# Patient Record
Sex: FEMALE | Race: WHITE | URBAN - METROPOLITAN AREA
[De-identification: names, ages, dates, MRNs, and addresses within clinical notes are randomized per-mention and may not be internally consistent; named-entity substitution may affect disease eponyms.]

---

## 2017-08-14 ENCOUNTER — OFFICE VISIT (OUTPATIENT)
Dept: DERMATOLOGY | Age: 37
End: 2017-08-14

## 2017-08-14 DIAGNOSIS — D23.9 DERMATOFIBROMA: ICD-10-CM

## 2017-08-14 DIAGNOSIS — D22.9 MULTIPLE NEVI: Primary | ICD-10-CM

## 2017-08-14 DIAGNOSIS — D48.5 NEOPLASM OF UNCERTAIN BEHAVIOR OF SKIN: ICD-10-CM

## 2017-08-14 PROCEDURE — 11100 PR BIOPSY OF SKIN LESION: CPT | Performed by: DERMATOLOGY

## 2017-08-14 PROCEDURE — 99202 OFFICE O/P NEW SF 15 MIN: CPT | Performed by: DERMATOLOGY

## 2017-08-22 ENCOUNTER — TELEPHONE (OUTPATIENT)
Dept: DERMATOLOGY | Age: 37
End: 2017-08-22

## 2018-05-15 ENCOUNTER — OFFICE VISIT (OUTPATIENT)
Dept: DERMATOLOGY | Age: 38
End: 2018-05-15

## 2018-05-15 DIAGNOSIS — D22.9 MULTIPLE NEVI: Primary | ICD-10-CM

## 2018-05-15 DIAGNOSIS — Z86.018 HISTORY OF DYSPLASTIC NEVUS: ICD-10-CM

## 2018-05-15 PROCEDURE — 99212 OFFICE O/P EST SF 10 MIN: CPT | Performed by: DERMATOLOGY

## 2019-01-21 LAB
ALBUMIN SERPL-MCNC: 4.5 G/DL
ALP BLD-CCNC: 48 U/L
ALT SERPL-CCNC: 7 U/L
ANION GAP SERPL CALCULATED.3IONS-SCNC: 6 MMOL/L
AST SERPL-CCNC: 16 U/L
BASOPHILS ABSOLUTE: 0 /ΜL
BASOPHILS RELATIVE PERCENT: 0.2 %
BILIRUB SERPL-MCNC: 0.6 MG/DL (ref 0.1–1.4)
BUN BLDV-MCNC: 20 MG/DL
CALCIUM SERPL-MCNC: 9.7 MG/DL
CHLORIDE BLD-SCNC: 105 MMOL/L
CHOLESTEROL, FASTING: 166
CO2: 26 MMOL/L
CREAT SERPL-MCNC: 0.83 MG/DL
EOSINOPHILS ABSOLUTE: 0.1 /ΜL
EOSINOPHILS RELATIVE PERCENT: 2.4 %
GFR CALCULATED: 78
GLUCOSE BLD-MCNC: 95 MG/DL
HCT VFR BLD CALC: 38.6 % (ref 36–46)
HDLC SERPL-MCNC: 58 MG/DL (ref 35–70)
HEMOGLOBIN: 13.2 G/DL (ref 12–16)
LDL CHOLESTEROL CALCULATED: 96 MG/DL (ref 0–160)
LYMPHOCYTES ABSOLUTE: 1.7 /ΜL
LYMPHOCYTES RELATIVE PERCENT: 42.3 %
MCH RBC QN AUTO: 30 PG
MCHC RBC AUTO-ENTMCNC: 34.3 G/DL
MCV RBC AUTO: 87.5 FL
MONOCYTES ABSOLUTE: 0.3 /ΜL
MONOCYTES RELATIVE PERCENT: 7 %
NEUTROPHILS ABSOLUTE: 2 /ΜL
NEUTROPHILS RELATIVE PERCENT: 48.1 %
PLATELET # BLD: 168 K/ΜL
PMV BLD AUTO: 10.5 FL
POTASSIUM SERPL-SCNC: 4.5 MMOL/L
RBC # BLD: 4.41 10^6/ΜL
SODIUM BLD-SCNC: 137 MMOL/L
TOTAL PROTEIN: 7.1
TRIGLYCERIDE, FASTING: 62
WBC # BLD: 4.1 10^3/ML

## 2019-03-28 ENCOUNTER — OFFICE VISIT (OUTPATIENT)
Dept: PRIMARY CARE CLINIC | Age: 39
End: 2019-03-28
Payer: COMMERCIAL

## 2019-03-28 VITALS
DIASTOLIC BLOOD PRESSURE: 78 MMHG | WEIGHT: 143.4 LBS | HEART RATE: 78 BPM | SYSTOLIC BLOOD PRESSURE: 110 MMHG | OXYGEN SATURATION: 99 %

## 2019-03-28 DIAGNOSIS — J01.00 ACUTE NON-RECURRENT MAXILLARY SINUSITIS: ICD-10-CM

## 2019-03-28 DIAGNOSIS — H66.92 LEFT OTITIS MEDIA WITH SPONTANEOUS RUPTURE OF EARDRUM: Primary | ICD-10-CM

## 2019-03-28 DIAGNOSIS — H72.92 LEFT OTITIS MEDIA WITH SPONTANEOUS RUPTURE OF EARDRUM: Primary | ICD-10-CM

## 2019-03-28 PROCEDURE — 99202 OFFICE O/P NEW SF 15 MIN: CPT | Performed by: NURSE PRACTITIONER

## 2019-03-28 RX ORDER — AMOXICILLIN AND CLAVULANATE POTASSIUM 875; 125 MG/1; MG/1
1 TABLET, FILM COATED ORAL 2 TIMES DAILY
Qty: 20 TABLET | Refills: 0 | Status: SHIPPED | OUTPATIENT
Start: 2019-03-28 | End: 2019-10-25 | Stop reason: SDUPTHER

## 2019-03-28 RX ORDER — AMOXICILLIN 500 MG/1
CAPSULE ORAL
Refills: 0 | COMMUNITY
Start: 2019-03-23 | End: 2019-03-28 | Stop reason: ALTCHOICE

## 2019-03-28 RX ORDER — CIPROFLOXACIN AND DEXAMETHASONE 3; 1 MG/ML; MG/ML
4 SUSPENSION/ DROPS AURICULAR (OTIC) 2 TIMES DAILY
Qty: 1 BOTTLE | Refills: 0 | Status: SHIPPED | OUTPATIENT
Start: 2019-03-28 | End: 2019-10-25 | Stop reason: SDUPTHER

## 2019-03-28 ASSESSMENT — ENCOUNTER SYMPTOMS
SHORTNESS OF BREATH: 0
EYE PAIN: 0
RHINORRHEA: 0
WHEEZING: 0
SINUS PRESSURE: 1
BACK PAIN: 0
PHOTOPHOBIA: 0
DIARRHEA: 0
TROUBLE SWALLOWING: 0
EYE DISCHARGE: 0
EYE ITCHING: 0
NAUSEA: 0
COLOR CHANGE: 0
CONSTIPATION: 0
CHEST TIGHTNESS: 0
EYE REDNESS: 0
SINUS PAIN: 0
COUGH: 0
SORE THROAT: 0
VOMITING: 0
ABDOMINAL PAIN: 0

## 2019-04-01 ENCOUNTER — OFFICE VISIT (OUTPATIENT)
Dept: PRIMARY CARE CLINIC | Age: 39
End: 2019-04-01
Payer: COMMERCIAL

## 2019-04-01 VITALS
TEMPERATURE: 98.4 F | RESPIRATION RATE: 16 BRPM | HEART RATE: 76 BPM | DIASTOLIC BLOOD PRESSURE: 76 MMHG | WEIGHT: 143 LBS | SYSTOLIC BLOOD PRESSURE: 108 MMHG | OXYGEN SATURATION: 99 %

## 2019-04-01 DIAGNOSIS — H72.92 LEFT OTITIS MEDIA WITH SPONTANEOUS RUPTURE OF EARDRUM: Primary | ICD-10-CM

## 2019-04-01 DIAGNOSIS — H66.92 LEFT OTITIS MEDIA WITH SPONTANEOUS RUPTURE OF EARDRUM: Primary | ICD-10-CM

## 2019-04-01 PROCEDURE — 99212 OFFICE O/P EST SF 10 MIN: CPT | Performed by: NURSE PRACTITIONER

## 2019-04-01 RX ORDER — METHYLPREDNISOLONE 4 MG/1
TABLET ORAL
Qty: 1 KIT | Refills: 0 | Status: SHIPPED | OUTPATIENT
Start: 2019-04-01 | End: 2019-05-14

## 2019-04-01 ASSESSMENT — ENCOUNTER SYMPTOMS
EYE DISCHARGE: 0
WHEEZING: 0
EYE PAIN: 0
TROUBLE SWALLOWING: 0
COUGH: 0
SORE THROAT: 0
EYE ITCHING: 0
RHINORRHEA: 0
SHORTNESS OF BREATH: 0
PHOTOPHOBIA: 0
SINUS PAIN: 0
CHEST TIGHTNESS: 0
COLOR CHANGE: 0
SINUS PRESSURE: 1
EYE REDNESS: 0

## 2019-04-01 NOTE — PROGRESS NOTES
mouth.      Return in about 1 week (around 4/8/2019). Patient agrees to plan and expresses understanding.     Dimitry-Grade-Allee 18, APRN - NP

## 2019-05-14 ENCOUNTER — OFFICE VISIT (OUTPATIENT)
Dept: DERMATOLOGY | Age: 39
End: 2019-05-14
Payer: COMMERCIAL

## 2019-05-14 DIAGNOSIS — L57.0 LICHENOID KERATOSIS: ICD-10-CM

## 2019-05-14 DIAGNOSIS — D22.9 MULTIPLE NEVI: Primary | ICD-10-CM

## 2019-05-14 DIAGNOSIS — D23.9 DERMATOFIBROMA: ICD-10-CM

## 2019-05-14 DIAGNOSIS — Z86.018 HISTORY OF DYSPLASTIC NEVUS: ICD-10-CM

## 2019-05-14 PROCEDURE — 99213 OFFICE O/P EST LOW 20 MIN: CPT | Performed by: DERMATOLOGY

## 2019-05-14 NOTE — PROGRESS NOTES
[facility]  [address]      Kervin Gomes  1980    42 y.o. female     Date of Visit: 2019    Last seen:     Chief Complaint: moles, f/u dysplastic nevus  Chief Complaint   Patient presents with    Skin Exam     History of Present Illness:    1, 2. Here for f/u s/p bx of dysplastic nevus on the R upper back at visit in .   s/p bx of lesion on the Rt upper back  - read as dysplastic nevus - mild dysplasia but removed with the biopsy. No probs with bx site. No new concerns or changing nevi noted. She notes a lesion on the abdomen that she would like checked. No personal hx of skin cancer. No family hx of skin cancer except her uncle (lived in [place]) just passed away from skin cancer. Wears sunscreen regularly on her face and on the rest of her body when at the pool. She is a teacher at [employer]. She has 10year-old twins and a 3-2/2year-old daughter. They go to [place]. No tanning bed use. Review of Systems:  Gen: Feels well, good sense of health. Skin: No changing moles or lesions. Past Medical History, Family History, Surgical History, Medications and Allergies reviewed. No outpatient medications have been marked as taking for the 19 encounter (Office Visit) with Lawrence Griffin MD.     Allergies   Allergen Reactions    Bactrim [Sulfamethoxazole-Trimethoprim]        History reviewed. No pertinent past medical history. Past Surgical History:   Procedure Laterality Date     SECTION         Physical Examination     Gen, well-appearing  The following were examined and determined to be normal: Psych/Neuro, Scalp/hair, Head/face, Conjunctivae/eyelids, Gums/teeth/lips, Neck, Breast/axilla/chest, Abdomen, Back, RUE, LUE, RLE, LLE, Nails/digits and buttocks. The following were examined and determined to be abnormal: none.      trunk and extremities with scattered brown macules and papules, many dark brown  R flank with oval dark brown uniform macule  Abdomen with angulated medium brown macule  Central back with macular slightly hypopigmented smooth scar with central pinpoint light brown pigment - stable  L calf with firm dermal pinkish-brown papule   R shin with pink 2 mm shiny macule            Assessment and Plan     1, 2. Benign-appearing nevi and Hx of dysplastic nevus - R upper back  3. DF - L calf and ? LK - R shin   - educ re ABCD's of MM   educ sun protection   encouraged skin check yearly (sooner if indicated), self checks  - check R shin again in 1 year - sooner prn growth, bleeding,.  sx

## 2019-10-25 ENCOUNTER — OFFICE VISIT (OUTPATIENT)
Dept: PRIMARY CARE CLINIC | Age: 39
End: 2019-10-25
Payer: COMMERCIAL

## 2019-10-25 VITALS
SYSTOLIC BLOOD PRESSURE: 102 MMHG | HEART RATE: 74 BPM | WEIGHT: 140.6 LBS | DIASTOLIC BLOOD PRESSURE: 64 MMHG | OXYGEN SATURATION: 99 % | TEMPERATURE: 98.4 F

## 2019-10-25 DIAGNOSIS — H66.93 RECURRENT AOM (ACUTE OTITIS MEDIA) OF BOTH EARS: Primary | ICD-10-CM

## 2019-10-25 PROCEDURE — 99213 OFFICE O/P EST LOW 20 MIN: CPT | Performed by: NURSE PRACTITIONER

## 2019-10-25 RX ORDER — CIPROFLOXACIN AND DEXAMETHASONE 3; 1 MG/ML; MG/ML
4 SUSPENSION/ DROPS AURICULAR (OTIC) 2 TIMES DAILY
Qty: 1 BOTTLE | Refills: 0 | Status: SHIPPED | OUTPATIENT
Start: 2019-10-25 | End: 2019-11-01

## 2019-10-25 RX ORDER — AMOXICILLIN AND CLAVULANATE POTASSIUM 875; 125 MG/1; MG/1
1 TABLET, FILM COATED ORAL 2 TIMES DAILY
Qty: 20 TABLET | Refills: 0 | Status: SHIPPED | OUTPATIENT
Start: 2019-10-25 | End: 2019-11-04

## 2019-10-25 ASSESSMENT — ENCOUNTER SYMPTOMS
SORE THROAT: 0
RHINORRHEA: 1
COUGH: 0
SHORTNESS OF BREATH: 0

## 2019-10-30 ENCOUNTER — OFFICE VISIT (OUTPATIENT)
Dept: ENT CLINIC | Age: 39
End: 2019-10-30
Payer: COMMERCIAL

## 2019-10-30 ENCOUNTER — TELEPHONE (OUTPATIENT)
Dept: ENT CLINIC | Age: 39
End: 2019-10-30

## 2019-10-30 VITALS
DIASTOLIC BLOOD PRESSURE: 73 MMHG | TEMPERATURE: 97.1 F | HEIGHT: 65 IN | BODY MASS INDEX: 23.66 KG/M2 | SYSTOLIC BLOOD PRESSURE: 111 MMHG | HEART RATE: 65 BPM | WEIGHT: 142 LBS

## 2019-10-30 DIAGNOSIS — H69.83 DYSFUNCTION OF BOTH EUSTACHIAN TUBES: Primary | ICD-10-CM

## 2019-10-30 PROCEDURE — 99243 OFF/OP CNSLTJ NEW/EST LOW 30: CPT | Performed by: OTOLARYNGOLOGY

## 2019-10-30 ASSESSMENT — ENCOUNTER SYMPTOMS
WHEEZING: 0
EYE DISCHARGE: 0
STRIDOR: 0
TROUBLE SWALLOWING: 0
BACK PAIN: 0
VOMITING: 0
BLOOD IN STOOL: 0
SINUS PRESSURE: 0
PHOTOPHOBIA: 0
SINUS PAIN: 0
NAUSEA: 0
EYE ITCHING: 0
CONSTIPATION: 0
COLOR CHANGE: 0
SHORTNESS OF BREATH: 0
DIARRHEA: 0
RHINORRHEA: 0
FACIAL SWELLING: 0
SORE THROAT: 0
COUGH: 0
VOICE CHANGE: 0
CHOKING: 0

## 2019-10-31 ENCOUNTER — TELEPHONE (OUTPATIENT)
Dept: ENT CLINIC | Age: 39
End: 2019-10-31

## 2019-11-15 ENCOUNTER — PROCEDURE VISIT (OUTPATIENT)
Dept: ENT CLINIC | Age: 39
End: 2019-11-15
Payer: COMMERCIAL

## 2019-11-15 VITALS
BODY MASS INDEX: 22.99 KG/M2 | TEMPERATURE: 97.2 F | SYSTOLIC BLOOD PRESSURE: 106 MMHG | DIASTOLIC BLOOD PRESSURE: 64 MMHG | WEIGHT: 138 LBS | HEART RATE: 72 BPM | HEIGHT: 65 IN

## 2019-11-15 DIAGNOSIS — H69.83 DYSFUNCTION OF BOTH EUSTACHIAN TUBES: Primary | ICD-10-CM

## 2019-11-15 PROCEDURE — 69433 CREATE EARDRUM OPENING: CPT | Performed by: OTOLARYNGOLOGY

## 2019-11-20 ENCOUNTER — OFFICE VISIT (OUTPATIENT)
Dept: PRIMARY CARE CLINIC | Age: 39
End: 2019-11-20
Payer: COMMERCIAL

## 2019-11-20 VITALS
TEMPERATURE: 98.2 F | OXYGEN SATURATION: 95 % | WEIGHT: 137.8 LBS | BODY MASS INDEX: 22.14 KG/M2 | HEIGHT: 66 IN | SYSTOLIC BLOOD PRESSURE: 102 MMHG | DIASTOLIC BLOOD PRESSURE: 68 MMHG | HEART RATE: 68 BPM

## 2019-11-20 DIAGNOSIS — Z00.00 ROUTINE GENERAL MEDICAL EXAMINATION AT A HEALTH CARE FACILITY: Primary | ICD-10-CM

## 2019-11-20 PROCEDURE — 99395 PREV VISIT EST AGE 18-39: CPT | Performed by: NURSE PRACTITIONER

## 2019-11-20 SDOH — HEALTH STABILITY: MENTAL HEALTH: HOW MANY STANDARD DRINKS CONTAINING ALCOHOL DO YOU HAVE ON A TYPICAL DAY?: 1 OR 2

## 2019-11-20 ASSESSMENT — PATIENT HEALTH QUESTIONNAIRE - PHQ9
SUM OF ALL RESPONSES TO PHQ QUESTIONS 1-9: 0
SUM OF ALL RESPONSES TO PHQ9 QUESTIONS 1 & 2: 0
2. FEELING DOWN, DEPRESSED OR HOPELESS: 0
SUM OF ALL RESPONSES TO PHQ QUESTIONS 1-9: 0
1. LITTLE INTEREST OR PLEASURE IN DOING THINGS: 0

## 2019-11-20 ASSESSMENT — ENCOUNTER SYMPTOMS
ALLERGIC/IMMUNOLOGIC NEGATIVE: 1
NAUSEA: 0
SHORTNESS OF BREATH: 0
COUGH: 0
VOMITING: 0
EYES NEGATIVE: 1
DIARRHEA: 0
CONSTIPATION: 0

## 2019-12-06 ENCOUNTER — OFFICE VISIT (OUTPATIENT)
Dept: ENT CLINIC | Age: 39
End: 2019-12-06
Payer: COMMERCIAL

## 2019-12-06 VITALS
HEART RATE: 63 BPM | TEMPERATURE: 97 F | WEIGHT: 138 LBS | DIASTOLIC BLOOD PRESSURE: 62 MMHG | SYSTOLIC BLOOD PRESSURE: 100 MMHG | BODY MASS INDEX: 22.18 KG/M2 | HEIGHT: 66 IN

## 2019-12-06 DIAGNOSIS — H69.83 DYSFUNCTION OF BOTH EUSTACHIAN TUBES: Primary | ICD-10-CM

## 2019-12-06 PROCEDURE — 99213 OFFICE O/P EST LOW 20 MIN: CPT | Performed by: OTOLARYNGOLOGY

## 2019-12-06 ASSESSMENT — ENCOUNTER SYMPTOMS
NAUSEA: 0
COLOR CHANGE: 0
SINUS PRESSURE: 0
VOICE CHANGE: 0
CHOKING: 0
EYE ITCHING: 0
DIARRHEA: 0
TROUBLE SWALLOWING: 0
SORE THROAT: 0
FACIAL SWELLING: 0
STRIDOR: 0
EYE PAIN: 0
PHOTOPHOBIA: 0
EYE REDNESS: 0
SINUS PAIN: 0
RHINORRHEA: 0
SHORTNESS OF BREATH: 0
COUGH: 0

## 2020-06-16 ENCOUNTER — OFFICE VISIT (OUTPATIENT)
Dept: ENT CLINIC | Age: 40
End: 2020-06-16
Payer: COMMERCIAL

## 2020-06-16 VITALS
WEIGHT: 138 LBS | HEIGHT: 66 IN | BODY MASS INDEX: 22.18 KG/M2 | DIASTOLIC BLOOD PRESSURE: 65 MMHG | HEART RATE: 68 BPM | SYSTOLIC BLOOD PRESSURE: 98 MMHG | TEMPERATURE: 97.2 F

## 2020-06-16 PROCEDURE — 99212 OFFICE O/P EST SF 10 MIN: CPT | Performed by: OTOLARYNGOLOGY

## 2020-06-16 ASSESSMENT — ENCOUNTER SYMPTOMS
PHOTOPHOBIA: 0
VOICE CHANGE: 0
SHORTNESS OF BREATH: 0
CHOKING: 0
RHINORRHEA: 0
EYE REDNESS: 0
SINUS PRESSURE: 0
EYE ITCHING: 0
SORE THROAT: 0
SINUS PAIN: 0
COLOR CHANGE: 0
EYE PAIN: 0
NAUSEA: 0
STRIDOR: 0
TROUBLE SWALLOWING: 0
DIARRHEA: 0
FACIAL SWELLING: 0
COUGH: 0

## 2020-06-16 NOTE — PROGRESS NOTES
[facility]  [address] [address] [facility], [address] [place] [address] Andre  P: [phone]  F: [phone]       Patient     Luc Holley  1980    ChiefComplaint     Chief Complaint   Patient presents with    Follow-up     Patient is here today for her 4 month follow up onher ears, she says here ears still get a little stuffy now and then but that it is better than what it has been        History of Present Illness     Luc Holley is a pleasant 47 y.o. female here for follow-up for bilateral eustachian tube dysfunction. She states her symptoms have significantly improved compared to prior to tube placement. However she does have some mild intermittent pressure sensation. No change in hearing, tinnitus or dizziness. No otorrhea or otalgia. Past Medical History     Past Medical History:   Diagnosis Date    Dysplastic nevus     Recurrent AOM (acute otitis media) 2019       Past Surgical History     Past Surgical History:   Procedure Laterality Date     SECTION  2012    TYMPANOSTOMY TUBE PLACEMENT Bilateral 2019    Both ears    WISDOM TOOTH EXTRACTION         Family History     Family History   Problem Relation Age of Onset    High Blood Pressure Mother     Colon Polyps Father     Bipolar Disorder Brother        Social History     Social History     Socioeconomic History    Marital status:      Spouse name: Not on file    Number of children: Not on file    Years of education: Not on file    Highest education level: Not on file   Occupational History    Not on file   Social Needs    Financial resource strain: Not on file    Food insecurity     Worry: Not on file     Inability: Not on file    Transportation needs     Medical: Not on file     Non-medical: Not on file   Tobacco Use    Smoking status: Never Smoker    Smokeless tobacco: Never Used   Substance and Sexual Activity    Alcohol use:  Yes     Alcohol/week: 1.0 standard drinks     Types: 1 Psychiatric/Behavioral: Negative for agitation and confusion. PhysicalExam     Vitals:    06/16/20 0916   BP: 98/65   Pulse: 68   Temp: 97.2 °F (36.2 °C)       Physical Exam  Constitutional:       Appearance: She is well-developed. HENT:      Head: Normocephalic and atraumatic. Jaw: No trismus. Right Ear: Tympanic membrane, ear canal and external ear normal. No drainage. No middle ear effusion. A PE tube is present. Tympanic membrane is not perforated. Left Ear: Tympanic membrane, ear canal and external ear normal. No drainage. No middle ear effusion. A PE tube is present. Tympanic membrane is not perforated. Nose: No septal deviation, mucosal edema or rhinorrhea. Mouth/Throat:      Dentition: Normal dentition. Pharynx: Uvula midline. No oropharyngeal exudate. Eyes:      General: No scleral icterus. Right eye: No discharge. Left eye: No discharge. Pupils: Pupils are equal, round, and reactive to light. Neck:      Musculoskeletal: Neck supple. Thyroid: No thyromegaly. Trachea: Phonation normal. No tracheal deviation. Pulmonary:      Effort: Pulmonary effort is normal. No respiratory distress. Breath sounds: No stridor. Lymphadenopathy:      Cervical: No cervical adenopathy. Skin:     General: Skin is warm and dry. Neurological:      Mental Status: She is alert and oriented to person, place, and time. Cranial Nerves: No cranial nerve deficit. Psychiatric:         Behavior: Behavior normal.           Procedure           Assessment and Plan     1. Dysfunction of both eustachian tubes  PE tubes in good position and patent. No otorrhea. Symptoms well controlled. Follow-up in 4 months. Return in about 4 months (around 10/16/2020). Portions of this note were dictated using Dragon.  There may be linguistic errors secondary to the use of this program.

## 2020-11-13 ENCOUNTER — OFFICE VISIT (OUTPATIENT)
Dept: ENT CLINIC | Age: 40
End: 2020-11-13
Payer: COMMERCIAL

## 2020-11-13 VITALS
WEIGHT: 138 LBS | SYSTOLIC BLOOD PRESSURE: 103 MMHG | DIASTOLIC BLOOD PRESSURE: 63 MMHG | HEIGHT: 66 IN | BODY MASS INDEX: 22.18 KG/M2 | HEART RATE: 71 BPM | TEMPERATURE: 97.9 F

## 2020-11-13 PROCEDURE — 99213 OFFICE O/P EST LOW 20 MIN: CPT | Performed by: OTOLARYNGOLOGY

## 2020-11-13 PROCEDURE — 92504 EAR MICROSCOPY EXAMINATION: CPT | Performed by: OTOLARYNGOLOGY

## 2020-11-13 ASSESSMENT — ENCOUNTER SYMPTOMS
TROUBLE SWALLOWING: 0
VOICE CHANGE: 0
SHORTNESS OF BREATH: 0
NAUSEA: 0
DIARRHEA: 0
CHOKING: 0
COUGH: 0
STRIDOR: 0
EYE PAIN: 0
COLOR CHANGE: 0
FACIAL SWELLING: 0
EYE REDNESS: 0
PHOTOPHOBIA: 0
SINUS PAIN: 0
SINUS PRESSURE: 0
SORE THROAT: 0
RHINORRHEA: 0
EYE ITCHING: 0

## 2020-11-13 NOTE — PROGRESS NOTES
[facility]  [address] [address] [facility], [address] [place] [address]  P: [phone]  F: [phone]       Patient     Yary Arroyo  1980    ChiefComplaint     Chief Complaint   Patient presents with    Follow-up     Patient is here today for her 4 month follow up on her ears, both ears are doing well but the left ear when she tucks on her ear lobe it hurts a little more than the right ear       History of Present Illness     Yary Arroyo is a pleasant 47 y.o. female here for 4-month follow-up for bilateral eustachian tube dysfunction. She states overall she is feeling relatively well. She does have a slight twinge of pressure in the left ear every now and then. No significant change in hearing. No otorrhea. No otalgia. Past Medical History     Past Medical History:   Diagnosis Date    Dysplastic nevus     Recurrent AOM (acute otitis media) 2019       Past Surgical History     Past Surgical History:   Procedure Laterality Date     SECTION  2012    TYMPANOSTOMY TUBE PLACEMENT Bilateral 2019    Both ears    WISDOM TOOTH EXTRACTION         Family History     Family History   Problem Relation Age of Onset    High Blood Pressure Mother     Colon Polyps Father     Bipolar Disorder Brother        Social History     Social History     Socioeconomic History    Marital status:      Spouse name: Not on file    Number of children: Not on file    Years of education: Not on file    Highest education level: Not on file   Occupational History    Not on file   Social Needs    Financial resource strain: Not on file    Food insecurity     Worry: Not on file     Inability: Not on file    Transportation needs     Medical: Not on file     Non-medical: Not on file   Tobacco Use    Smoking status: Never Smoker    Smokeless tobacco: Never Used   Substance and Sexual Activity    Alcohol use:  Yes     Alcohol/week: 1.0 standard drinks     Types: 1 Standard drinks or equivalent per week     Drinks per session: 1 or 2     Binge frequency: Weekly    Drug use: Never    Sexual activity: Yes     Partners: Male     Birth control/protection: Other-see comments     Comment: vasectomy/   Lifestyle    Physical activity     Days per week: Not on file     Minutes per session: Not on file    Stress: Not on file   Relationships    Social connections     Talks on phone: Not on file     Gets together: Not on file     Attends Che[employer] service: Not on file     Active member of club or organization: Not on file     Attends meetings of clubs or organizations: Not on file     Relationship status: Not on file    Intimate partner violence     Fear of current or ex partner: Not on file     Emotionally abused: Not on file     Physically abused: Not on file     Forced sexual activity: Not on file   Other Topics Concern    Not on file   Social History Narrative    Not on file       Allergies     Allergies   Allergen Reactions    Bactrim [Sulfamethoxazole-Trimethoprim]        Medications     No current outpatient medications on file. No current facility-administered medications for this visit. Review of Systems     Review of Systems   Constitutional: Negative for chills, fatigue and fever. HENT: Negative for congestion, ear discharge, ear pain, facial swelling, hearing loss, nosebleeds, postnasal drip, rhinorrhea, sinus pressure, sinus pain, sneezing, sore throat, tinnitus, trouble swallowing and voice change. Eyes: Negative for photophobia, pain, redness, itching and visual disturbance. Respiratory: Negative for cough, choking, shortness of breath and stridor. Gastrointestinal: Negative for diarrhea and nausea. Musculoskeletal: Negative for neck pain and neck stiffness. Skin: Negative for color change and rash. Neurological: Negative for dizziness, facial asymmetry and light-headedness. Hematological: Negative for adenopathy.    Psychiatric/Behavioral: Negative auditory canal with use of an operating microscope. No evidence of fluid or retraction. Given that the patient is asymptomatic at this time, no for intervention. Counseled the patient that if her symptoms return she should call. We will likely consider placing another set of PE tubes at that time. Return if symptoms worsen or fail to improve. Portions of this note were dictated using Dragon.  There may be linguistic errors secondary to the use of this program.

## 2022-01-10 ENCOUNTER — OFFICE VISIT (OUTPATIENT)
Dept: DERMATOLOGY | Age: 42
End: 2022-01-10
Payer: COMMERCIAL

## 2022-01-10 VITALS — TEMPERATURE: 97 F

## 2022-01-10 DIAGNOSIS — Z86.018 HISTORY OF DYSPLASTIC NEVUS: ICD-10-CM

## 2022-01-10 DIAGNOSIS — D23.9 DERMATOFIBROMA: ICD-10-CM

## 2022-01-10 DIAGNOSIS — D48.5 NEOPLASM OF UNCERTAIN BEHAVIOR OF SKIN: ICD-10-CM

## 2022-01-10 DIAGNOSIS — L81.4 LENTIGO: ICD-10-CM

## 2022-01-10 DIAGNOSIS — D22.9 MULTIPLE NEVI: Primary | ICD-10-CM

## 2022-01-10 PROCEDURE — 11102 TANGNTL BX SKIN SINGLE LES: CPT | Performed by: DERMATOLOGY

## 2022-01-10 PROCEDURE — 99213 OFFICE O/P EST LOW 20 MIN: CPT | Performed by: DERMATOLOGY

## 2022-01-10 NOTE — PATIENT INSTRUCTIONS
Elta MD UV Clear - daily moisturizer with SPF  Hyaluronic acid serum - LaRoche Posay B5        Biopsy Wound Care Instructions    · Keep the bandage in place for 24 hours. · Cleanse the wound with mild soapy water daily   Gently dry the area.  Apply Vaseline or petroleum jelly to the wound using a cotton tipped applicator.  Cover with a clean bandage.  Repeat this process until the biopsy site is healed.  If you had stitches placed, continue treating the site until the stitches are removed. Remember to make an appointment to return to have your stitches removed by our staff.  You may shower and bathe as usual.       ** Biopsy results generally take around 7 business days to come back. If you have not heard from us by then, please call the office at [phone]. *Please note that biopsy results are released to both the patient and physician at the same time in [address] Please allow time for your physician to review the results. One of our staff members will reach out to you with the results and plan. Cryosurgery (Freezing) Wound Care Instructions    AFTER THE PROCEDURE:    You will notice swelling and redness around the site. This is normal.    You may experience a sharp or sore feeling for the next several days. For this discomfort, you may take acetaminophen (Tylenol©).  A blister may develop at the treated area, sometimes as soon as by the end of the day. After several days, the blister will subside and a scab will form.  If the area is bumped or traumatized during the first few days following freezing, you may develop bleeding into the blister, forming a blood blister. This is nothing to be alarmed about.  If the blister is tense, uncomfortable, or much larger than the site that was frozen, you may pop the blister along its edge with a sterile needle (boiled, heated under a flame, or cleaned with alcohol) to allow the fluid to drain out.  If the blister does not bother you, no treatment is needed.  Do NOT peel off the top of the blister roof. It will act as a dressing on top of your wound. WOUND CARE:    You may shower or bathe as usual, but avoid scrubbing the areas that have been frozen.  Cleanse the site twice a day with mild soapy water, and then apply a thin film of white petrolatum (Vaseline©).  You do not need to cover the area, but can if you prefer.  Do NOT allow the site to become dry or crusted, or attempt to dry it out with rubbing alcohol or hydrogen peroxide.  Continue this regimen until the area is pink and healed. Depending on the size and location of your cryosurgery site, healing may take 2 to 4 weeks.  The area may continue to be pink for several weeks, and over the next few months may become darker or lighter than the surrounding skin. This may be a permanent change.    

## 2022-01-10 NOTE — PROGRESS NOTES
[facility]  [facility]  1980    46 y.o. female     Date of Visit: 1/10/2022    Last seen:     Chief Complaint: moles, f/u dysplastic nevus  Chief Complaint   Patient presents with    Skin Lesion     re check moles      History of Present Illness:    1, 2. Here for f/u s/p bx of dysplastic nevus on the R upper back at visit in .   s/p bx of lesion on the Rt upper back  - read as dysplastic nevus - mild dysplasia but removed with the biopsy. No probs with bx site. No new concerns or changing nevi noted. Nevus on the abdomen and R flank noted in prebious photos - unchanged. She has a tan lesion on the right cheek that has been present for several years. 3.  She has a firm pinkish-brown lesion on the left shin/calf that has been present for many years. Noted at previous exam.  Unchanged. 4.  She has a persistent pink lesion on the right shin. It is asymptomatic. Slightly bigger than when she was last seen. No personal hx of skin cancer. No family hx of skin cancer except her uncle (lived in [place]) passed away from skin cancer. Wears sunscreen regularly on her face and on the rest of her body when at the pool. 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 They go to [place]. No tanning bed use. Review of Systems:  Gen: Feels well, good sense of health. Skin: No changing moles or lesions. Past Medical History, Family History, Surgical History, Medications and Allergies reviewed.     No outpatient medications have been marked as taking for the 1/10/22 encounter (Office Visit) with Amrita Plummer MD.     Allergies   Allergen Reactions    Bactrim [Sulfamethoxazole-Trimethoprim]        Past Medical History:   Diagnosis Date    Dysplastic nevus     Recurrent AOM (acute otitis media)      Past Surgical History:   Procedure Laterality Date     SECTION  2012    TYMPANOSTOMY TUBE PLACEMENT Bilateral 2019    Both ears    WISDOM TOOTH EXTRACTION         Physical Examination     Gen, well-appearing  FSE today    trunk and extremities with scattered brown macules and papules, many dark brown  R flank with oval dark brown uniform macule -stable  Abdomen with angulated medium brown macule -stable  Central back with macular slightly hypopigmented smooth scar with central pinpoint light brown pigment - stable  L calf with firm dermal pinkish-brown papule   R shin with pink 3 mm shiny macule  Right zygoma with faint tan macule            Assessment and Plan     1. Benign-appearing nevi and Hx of dysplastic nevus - R upper back  2. R cheek - lentigo - light LN2 today after reassurance-no charge -discussed wound care  3. DF - L calf   - educ re ABCD's of MM   educ sun protection SPF 30+  encouraged skin check yearly (sooner if indicated), self checks    4. R shin - r/o nevus vs BCC  - Shave biopsy performed after verbal consent obtained. Patient educated regarding risk of bleeding, infection, scar and educated on wound care. Skin cleansed with alcohol pad and site anesthetized with lido + epi. Aluminum chloride applied to site for hemostasis. Petrolatum ointment and bandage applied. Specimen bottle labeled with patient information and site and specimen sent to [facility].

## 2022-01-13 LAB — DERMATOLOGY PATHOLOGY REPORT: NORMAL

## 2025-03-21 ENCOUNTER — TELEPHONE (OUTPATIENT)
Age: 45
End: 2025-03-21

## 2025-03-21 NOTE — TELEPHONE ENCOUNTER
Patient wants to schedule an annual skin exam.  Her lov was 1/2022.  Would the doctor still keep her as a patient?  When could she come in?  [phone]

## 2025-07-21 ENCOUNTER — OFFICE VISIT (OUTPATIENT)
Age: 45
End: 2025-07-21
Payer: COMMERCIAL

## 2025-07-21 DIAGNOSIS — L71.9 ROSACEA: ICD-10-CM

## 2025-07-21 DIAGNOSIS — Z86.018 HISTORY OF DYSPLASTIC NEVUS: ICD-10-CM

## 2025-07-21 DIAGNOSIS — D22.9 MULTIPLE NEVI: Primary | ICD-10-CM

## 2025-07-21 DIAGNOSIS — D23.9 DERMATOFIBROMA: ICD-10-CM

## 2025-07-21 DIAGNOSIS — L81.4 LENTIGINES: ICD-10-CM

## 2025-07-21 PROCEDURE — 99204 OFFICE O/P NEW MOD 45 MIN: CPT | Performed by: DERMATOLOGY

## 2025-07-21 RX ORDER — IVERMECTIN 10 MG/G
CREAM TOPICAL
Qty: 45 G | Refills: 2 | Status: SHIPPED | OUTPATIENT
Start: 2025-07-21

## 2025-07-21 NOTE — PROGRESS NOTES
[facility]  Jennifer Klein MD  [phone]      Bibi Gomes  1980    45 y.o. female     Date of Visit: 2025    Last seen:     Chief Complaint: moles, f/u dysplastic nevus  Chief Complaint   Patient presents with    Skin Exam     History of Present Illness:    1, 2. Here for f/u s/p bx of dysplastic nevus on the R upper back at visit in .   s/p bx of lesion on the Rt upper back  - read as dysplastic nevus - mild dysplasia but removed with the biopsy.   No probs with bx site.  No new concerns or changing nevi noted.  Nevus on the abdomen and R flank noted in previous photos - unchanged.    3.  She has a firm pinkish-brown lesion on the left shin/calf that has been present for many years.  Noted at previous exam.  Unchanged.    4.  She notes uneven skin tone on the cheeks and has mild erythema.  No treatment tried.    No personal hx of skin cancer.  No family hx of skin cancer except her uncle (lived in [place]) passed away from skin cancer.    Wears sunscreen regularly on her face and on the rest of her body when at the pool.    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  They go to [place].  No tanning bed use.    Review of Systems:  Gen: Feels well, good sense of health.  Skin: No changing moles or lesions.      Past Medical History, Family History, Surgical History, Medications and Allergies reviewed.    No outpatient medications have been marked as taking for the 25 encounter (Office Visit) with Jennifer Klein MD.     Allergies   Allergen Reactions    Bactrim [Sulfamethoxazole-Trimethoprim]        Past Medical History:   Diagnosis Date    Dysplastic nevus     Recurrent AOM (acute otitis media)      Past Surgical History:   Procedure Laterality Date     SECTION  2012    TYMPANOSTOMY TUBE PLACEMENT Bilateral 2019    Both ears    WISDOM TOOTH EXTRACTION         Physical Examination     Gen,